# Patient Record
Sex: FEMALE | URBAN - METROPOLITAN AREA
[De-identification: names, ages, dates, MRNs, and addresses within clinical notes are randomized per-mention and may not be internally consistent; named-entity substitution may affect disease eponyms.]

---

## 2024-02-15 ENCOUNTER — APPOINTMENT (RX ONLY)
Dept: URBAN - METROPOLITAN AREA CLINIC 175 | Facility: CLINIC | Age: 64
Setting detail: DERMATOLOGY
End: 2024-02-15

## 2024-02-15 DIAGNOSIS — L40.0 PSORIASIS VULGARIS: ICD-10-CM

## 2024-02-15 PROCEDURE — 99204 OFFICE O/P NEW MOD 45 MIN: CPT

## 2024-02-15 PROCEDURE — ? ADDITIONAL NOTES

## 2024-02-15 PROCEDURE — ? PRESCRIPTION MEDICATION MANAGEMENT

## 2024-02-15 PROCEDURE — ? COUNSELING

## 2024-02-15 PROCEDURE — ? ORDER TESTS

## 2024-02-15 ASSESSMENT — PGA PSORIASIS: PGA PSORIASIS 2020: MODERATE

## 2024-02-15 ASSESSMENT — ITCH NUMERIC RATING SCALE: HOW SEVERE IS YOUR ITCHING?: 7

## 2024-02-15 ASSESSMENT — BSA PSORIASIS: % BODY COVERED IN PSORIASIS: 15

## 2024-02-15 NOTE — PROCEDURE: ADDITIONAL NOTES
Additional Notes: Patient has had psoriasis for years that has been not well controlled with topical therapy. Most recently she’s been using betamethasone without benefit. She has extreme itch as well as social embarrassment. She would like to wear a swimsuit in the summer while on vacation.
Render Risk Assessment In Note?: no
Detail Level: Simple

## 2024-02-15 NOTE — PROCEDURE: PRESCRIPTION MEDICATION MANAGEMENT
Detail Level: Zone
Plan: Begin skirizi
Render In Strict Bullet Format?: No
Continue Regimen: Betamethasone

## 2024-02-15 NOTE — PROCEDURE: ORDER TESTS
Bill For Surgical Tray: no
Expected Date Of Service: 02/15/2024
Performing Laboratory: [id number]
Billing Type: Third-Party Bill

## 2024-03-01 ENCOUNTER — APPOINTMENT (RX ONLY)
Dept: URBAN - METROPOLITAN AREA CLINIC 175 | Facility: CLINIC | Age: 64
Setting detail: DERMATOLOGY
End: 2024-03-01

## 2024-03-01 DIAGNOSIS — L40.0 PSORIASIS VULGARIS: ICD-10-CM

## 2024-03-01 PROCEDURE — ? COUNSELING

## 2024-03-01 PROCEDURE — ? ADDITIONAL NOTES

## 2024-03-01 PROCEDURE — 99213 OFFICE O/P EST LOW 20 MIN: CPT

## 2024-03-01 PROCEDURE — ? PRESCRIPTION MEDICATION MANAGEMENT

## 2024-03-01 PROCEDURE — ? SKYRIZI INITIATION

## 2024-03-01 ASSESSMENT — LOCATION DETAILED DESCRIPTION DERM: LOCATION DETAILED: LEFT LATERAL ABDOMEN

## 2024-03-01 ASSESSMENT — LOCATION ZONE DERM: LOCATION ZONE: TRUNK

## 2024-03-01 ASSESSMENT — LOCATION SIMPLE DESCRIPTION DERM: LOCATION SIMPLE: ABDOMEN

## 2024-03-01 NOTE — PROCEDURE: ADDITIONAL NOTES
Render Risk Assessment In Note?: no
Detail Level: Simple
Additional Notes: We are still waiting  for approval on Skyrizi. We will start with samples in office today.
Additional Notes: Skyrizi injection left lower abdomen:\\nTreatment Number: 1\\n\\nAmount Given: 150 mg\\nSyringe Used: 150 mg/ml Prefilled Syringe (sample used)\\nDate of Next Injection: 4 weeks\\nNTX: 7766-7696-86\\n\\nThe risks of pain and injection site reactions were reviewed with the patient prior to the injection. After consent was obtained the left lateral abdomen was cleaned with alcohol and 150 mg of Skyrizi was injected subcutaneously.

## 2024-03-01 NOTE — PROCEDURE: SKYRIZI INITIATION
Skyrizi Dosing: 150 mg SC week 0 and week 4 then every 12 weeks thereafter
Detail Level: Zone
Diagnosis (Required): Psoriasis
Add Pregnancy And Lactation Warning To Medication Counseling?: No
Pregnancy And Lactation Warning Text: The risk during pregnancy and breastfeeding is uncertain with this medication.
Skyrizi Monitoring Guidelines: A yearly test for tuberculosis is required while taking Skyrizi.
Include Weight Question: Yes - Pounds

## 2024-03-29 ENCOUNTER — APPOINTMENT (RX ONLY)
Dept: URBAN - METROPOLITAN AREA CLINIC 175 | Facility: CLINIC | Age: 64
Setting detail: DERMATOLOGY
End: 2024-03-29

## 2024-03-29 ENCOUNTER — RX ONLY (OUTPATIENT)
Age: 64
Setting detail: RX ONLY
End: 2024-03-29

## 2024-03-29 DIAGNOSIS — L40.0 PSORIASIS VULGARIS: ICD-10-CM

## 2024-03-29 PROCEDURE — ? ADDITIONAL NOTES

## 2024-03-29 PROCEDURE — ? COUNSELING

## 2024-03-29 PROCEDURE — ? PRESCRIPTION MEDICATION MANAGEMENT

## 2024-03-29 PROCEDURE — 99213 OFFICE O/P EST LOW 20 MIN: CPT

## 2024-03-29 RX ORDER — RISANKIZUMAB-RZAA 150 MG/ML
INJECTION SUBCUTANEOUS
Qty: 2 | Refills: 3 | Status: ERX | COMMUNITY
Start: 2024-03-29

## 2024-03-29 RX ORDER — RISANKIZUMAB-RZAA 150 MG/ML
INJECTION SUBCUTANEOUS
Qty: 2 | Refills: 3 | Status: CANCELLED
Stop reason: CLARIF

## 2024-03-29 ASSESSMENT — ITCH NUMERIC RATING SCALE: HOW SEVERE IS YOUR ITCHING?: 5

## 2024-03-29 ASSESSMENT — PGA PSORIASIS: PGA PSORIASIS 2020: MILD

## 2024-03-29 ASSESSMENT — BSA PSORIASIS: % BODY COVERED IN PSORIASIS: 12

## 2024-03-29 NOTE — PROCEDURE: ADDITIONAL NOTES
Render Risk Assessment In Note?: no
Detail Level: Simple
Additional Notes: Pt was approved by her insurance for Mario, she is still waiting to receive medication. We will call pt once we have samples of Skyrizi to provide her with an injection.

## 2024-04-04 ENCOUNTER — APPOINTMENT (RX ONLY)
Dept: URBAN - METROPOLITAN AREA CLINIC 175 | Facility: CLINIC | Age: 64
Setting detail: DERMATOLOGY
End: 2024-04-04

## 2024-04-04 DIAGNOSIS — L40.0 PSORIASIS VULGARIS: ICD-10-CM

## 2024-04-04 PROCEDURE — ? SKYRIZI INJECTION

## 2024-04-04 PROCEDURE — ? PRESCRIPTION MEDICATION MANAGEMENT

## 2024-04-04 PROCEDURE — ? ADDITIONAL NOTES

## 2024-04-04 PROCEDURE — ? COUNSELING

## 2024-04-04 ASSESSMENT — LOCATION ZONE DERM: LOCATION ZONE: TRUNK

## 2024-04-04 ASSESSMENT — LOCATION DETAILED DESCRIPTION DERM: LOCATION DETAILED: LEFT LATERAL ABDOMEN

## 2024-04-04 ASSESSMENT — LOCATION SIMPLE DESCRIPTION DERM: LOCATION SIMPLE: ABDOMEN

## 2024-04-04 NOTE — PROCEDURE: SKYRIZI INJECTION
Consent: The risks of pain and injection site reactions were reviewed with the patient prior to the injection.
Treatment Number (Optional): 2
39170 Billing Preferences: By Number of Body Touches
Render If Medication Purchased By Clinic In Visit Note?: Yes
Detail Level: None
Expiration Date (Optional): 01/2025
Ndc (75mg/0.83ml Syringe): [address]-33
Skyrizi Amount: 150 mg
Syringe Size Used (Required For Enhanced Ndc): 150 mg/ml Prefilled Syringe
Hide Non-Enhanced Ndc Variable: No
Ndc (150mg/Ml Syringe): 3954-9148-68
Date Of Next Injection: 12 Weeks
J-Code: 
Lot # (Optional): [id number]
Administered By (Optional): HORTENCIA Billings

## 2024-04-23 ENCOUNTER — RX ONLY (OUTPATIENT)
Age: 64
Setting detail: RX ONLY
End: 2024-04-23

## 2024-04-23 RX ORDER — RISANKIZUMAB-RZAA 150 MG/ML
INJECTION SUBCUTANEOUS
Qty: 1 | Refills: 3 | Status: ERX

## 2024-04-23 RX ORDER — RISANKIZUMAB-RZAA 150 MG/ML
INJECTION SUBCUTANEOUS
Qty: 1 | Refills: 3 | Status: CANCELLED
Stop reason: SDUPTHER

## 2024-06-28 ENCOUNTER — APPOINTMENT (RX ONLY)
Dept: URBAN - METROPOLITAN AREA CLINIC 175 | Facility: CLINIC | Age: 64
Setting detail: DERMATOLOGY
End: 2024-06-28

## 2024-06-28 DIAGNOSIS — L81.4 OTHER MELANIN HYPERPIGMENTATION: ICD-10-CM

## 2024-06-28 DIAGNOSIS — D22 MELANOCYTIC NEVI: ICD-10-CM

## 2024-06-28 DIAGNOSIS — L82.1 OTHER SEBORRHEIC KERATOSIS: ICD-10-CM

## 2024-06-28 DIAGNOSIS — L40.0 PSORIASIS VULGARIS: ICD-10-CM

## 2024-06-28 DIAGNOSIS — D18.0 HEMANGIOMA: ICD-10-CM

## 2024-06-28 DIAGNOSIS — L57.8 OTHER SKIN CHANGES DUE TO CHRONIC EXPOSURE TO NONIONIZING RADIATION: ICD-10-CM

## 2024-06-28 DIAGNOSIS — D485 NEOPLASM OF UNCERTAIN BEHAVIOR OF SKIN: ICD-10-CM

## 2024-06-28 PROBLEM — D18.01 HEMANGIOMA OF SKIN AND SUBCUTANEOUS TISSUE: Status: ACTIVE | Noted: 2024-06-28

## 2024-06-28 PROBLEM — D48.5 NEOPLASM OF UNCERTAIN BEHAVIOR OF SKIN: Status: ACTIVE | Noted: 2024-06-28

## 2024-06-28 PROBLEM — D22.5 MELANOCYTIC NEVI OF TRUNK: Status: ACTIVE | Noted: 2024-06-28

## 2024-06-28 PROCEDURE — 99213 OFFICE O/P EST LOW 20 MIN: CPT | Mod: 25

## 2024-06-28 PROCEDURE — ? SKYRIZI INJECTION

## 2024-06-28 PROCEDURE — 96372 THER/PROPH/DIAG INJ SC/IM: CPT | Mod: 59

## 2024-06-28 PROCEDURE — ? BIOPSY BY SHAVE METHOD

## 2024-06-28 PROCEDURE — ? PRESCRIPTION MEDICATION MANAGEMENT

## 2024-06-28 PROCEDURE — 11102 TANGNTL BX SKIN SINGLE LES: CPT

## 2024-06-28 PROCEDURE — ? COUNSELING

## 2024-06-28 PROCEDURE — ? ADDITIONAL NOTES

## 2024-06-28 ASSESSMENT — LOCATION DETAILED DESCRIPTION DERM
LOCATION DETAILED: LEFT SUPERIOR MEDIAL LOWER BACK
LOCATION DETAILED: LEFT SUPERIOR MEDIAL UPPER BACK
LOCATION DETAILED: LEFT LATERAL ABDOMEN
LOCATION DETAILED: UPPER STERNUM
LOCATION DETAILED: SUPERIOR LUMBAR SPINE
LOCATION DETAILED: LEFT CENTRAL MALAR CHEEK
LOCATION DETAILED: MIDDLE STERNUM

## 2024-06-28 ASSESSMENT — ITCH NUMERIC RATING SCALE: HOW SEVERE IS YOUR ITCHING?: 0

## 2024-06-28 ASSESSMENT — LOCATION SIMPLE DESCRIPTION DERM
LOCATION SIMPLE: LOWER BACK
LOCATION SIMPLE: CHEST
LOCATION SIMPLE: LEFT UPPER BACK
LOCATION SIMPLE: ABDOMEN
LOCATION SIMPLE: LEFT CHEEK
LOCATION SIMPLE: LEFT LOWER BACK

## 2024-06-28 ASSESSMENT — LOCATION ZONE DERM
LOCATION ZONE: TRUNK
LOCATION ZONE: FACE
LOCATION ZONE: TRUNK

## 2024-06-28 ASSESSMENT — BSA PSORIASIS: % BODY COVERED IN PSORIASIS: 0

## 2024-06-28 ASSESSMENT — PGA PSORIASIS: PGA PSORIASIS 2020: CLEAR

## 2024-06-28 NOTE — PROCEDURE: ADDITIONAL NOTES
Render Risk Assessment In Note?: no
Detail Level: Simple
Additional Notes: Patient is very happy with treatment and is considering discontinuing Skirizi, as she is completely clear. I recommended against discontinuing. Patient will let us know what she decides. I discussion detailing reports of psoriasis returning and not responding well with second course of skirizi so we would have to possibly do another class. Also explain there were some reports of people who discontinued and stay cleared for years at time, it is difficult to say what will happen. Since she is doing well, I would recommend continuing current treatment.

## 2024-06-28 NOTE — PROCEDURE: SKYRIZI INJECTION
Consent: The risks of pain and injection site reactions were reviewed with the patient prior to the injection.
Treatment Number (Optional): 2
[id number] Billing Preferences: By Number of Body Touches
Render If Medication Purchased By Clinic In Visit Note?: Yes
Detail Level: None
Expiration Date (Optional): 01/2025
Ndc (75mg/0.83ml Syringe): [address]-75
Skyrizi Amount: 150 mg
Syringe Size Used (Required For Enhanced Ndc): 150 mg/ml Prefilled Syringe
Hide Non-Enhanced Ndc Variable: No
Ndc (150mg/Ml Syringe): 9632-1914-48
Date Of Next Injection: 12 Weeks
J-Code: 
Lot # (Optional): [id number]
Administered By (Optional): HORTENCIA Billings